# Patient Record
Sex: FEMALE | URBAN - METROPOLITAN AREA
[De-identification: names, ages, dates, MRNs, and addresses within clinical notes are randomized per-mention and may not be internally consistent; named-entity substitution may affect disease eponyms.]

---

## 2022-12-06 ENCOUNTER — OFFICE VISIT (OUTPATIENT)
Dept: OBGYN CLINIC | Age: 42
End: 2022-12-06
Payer: MEDICAID

## 2022-12-06 VITALS
HEIGHT: 65 IN | SYSTOLIC BLOOD PRESSURE: 140 MMHG | BODY MASS INDEX: 35.65 KG/M2 | DIASTOLIC BLOOD PRESSURE: 82 MMHG | WEIGHT: 214 LBS

## 2022-12-06 DIAGNOSIS — N92.0 MENORRHAGIA WITH REGULAR CYCLE: Primary | ICD-10-CM

## 2022-12-06 DIAGNOSIS — D25.9 UTERINE LEIOMYOMA, UNSPECIFIED LOCATION: ICD-10-CM

## 2022-12-06 PROCEDURE — G8427 DOCREV CUR MEDS BY ELIG CLIN: HCPCS | Performed by: OBSTETRICS & GYNECOLOGY

## 2022-12-06 PROCEDURE — 99213 OFFICE O/P EST LOW 20 MIN: CPT | Performed by: OBSTETRICS & GYNECOLOGY

## 2022-12-06 PROCEDURE — G8484 FLU IMMUNIZE NO ADMIN: HCPCS | Performed by: OBSTETRICS & GYNECOLOGY

## 2022-12-06 PROCEDURE — G8417 CALC BMI ABV UP PARAM F/U: HCPCS | Performed by: OBSTETRICS & GYNECOLOGY

## 2022-12-06 PROCEDURE — 4004F PT TOBACCO SCREEN RCVD TLK: CPT | Performed by: OBSTETRICS & GYNECOLOGY

## 2022-12-06 NOTE — PATIENT INSTRUCTIONS
We will schedule a pelvic ultrasound pelvic ultrasound to be done in the office at your convenience. We will contact you with that result and recommendation for follow-up. Happy holidays!

## 2022-12-06 NOTE — PROGRESS NOTES
[address] [facility] Enmanuel Talbot  [address] [place] [address] [address] [place] [address]       DATE OF VISIT:  22        History and Physical    Thai Duffy    :  1980  CHIEF COMPLAINT:    Chief Complaint   Patient presents with    Established New Doctor     New patient here for pelvic pain  has fibroids lmp 22 heavy with clots                     HPI :   Thai Duffy is a 43 y.o. femaleGRAVIDA 2 PARA    PCP: Amanda Cardoza MD    Thai Duffy presents to the office today with her  as a self-referral.  They are transplants from [place] and she has not seen a GYN for at least several years. She is an extremely poor historian but states that she has a chronic history of hydrocephalus and they are unsure of the etiology. She does have a PCP and a neurologist at the [facility] clinic. She has an appointment with the neurologist just after the new year. She is on her cycle today and does not want to be examined but states that she was told and 2016 that she had uterine fibroids. She has monthly cycles about every 26-28 days. Flow was only for 3 days but heavy. She also states that she suffers from chronic constipation. She denies any involuntary loss of urine.   She is otherwise without any significant complaints today.  _____________________________________________________________________  Past Medical History:   Diagnosis Date    High blood pressure                                                                    Past Surgical History:   Procedure Laterality Date    GALLBLADDER SURGERY  2018     Family History   Problem Relation Age of Onset    Hypertension Father     Diabetes Father     Hypertension Mother     Diabetes Mother      Social History     Tobacco Use   Smoking Status Every Day    Types: Cigarettes   Smokeless Tobacco Never     Social History     Substance and Sexual Activity   Alcohol Use None     Current Outpatient Medications   Medication Sig Dispense Refill    NONFORMULARY Blood pressure medication and medication for fluid on brain unknown of names       No current facility-administered medications for this visit. Allergies:  No Known Allergies    Gynecologic History:  Patient's last menstrual period was 2022. Sexually Active: Yes  STD History: No  Abnormal Pap History no  Birth Control: No    OB History    Para Term  AB Living   2 0 0 0 0 2   SAB IAB Ectopic Molar Multiple Live Births   0 0 0 0 0 0     ______________________________________________________________________  REVIEW OF SYSTEMS:        Constitutional:  Unexpected weight change no  Neurological:  Frequent headaches  yes  Ophthalmic:  Recent visual changes no  ENT:   Difficulty swallowing  no  Breast:              Masses   no     Respiratory:  Shortness of breath  no    Cardiovascular: Chest pain   no     Gastrointestinal: Chronic diarrhea/constipation yes   Urogenital:  Urinary incontinence  no                                         Heavy/irregular periods           yes                                      Vaginal discharge                   no  Hematological: Bruises easy   no     Endocrine:  Nipple Discharge  no     Hot/Cold Intolerance  no   Psychological:  Mood and affect were wnl yes                                                                                                                                           Physical Exam:    Vitals:    22 1335   BP: (!) 140/82   Site: Right Upper Arm   Position: Sitting   Cuff Size: Large Adult   Weight: 214 lb (97.1 kg)   Height: 5' 5\" (1.651 m)       General Appearance:  She does not appear to be in any distress. This  is a well developed, well nourished, well groomed female. Neurological:  The patient is alert and oriented to time, place, person, and situation without any noted sensory motor deficits. ASSESSMENT:         ICD-10-CM    1.  Menorrhagia with regular cycle  N92.0 US PELVIS COMPLETE      2. Uterine leiomyoma, unspecified location  D25.9 US PELVIS COMPLETE                      PLAN:  If Negative Cytology, Follow-up screening per current guidelines. Mammograms every 1year. If 35 yo and last mammogram was negative. Mixed incontinence - discussed bladder training and kegel exercises. Info given. Calcium and Vitamin D dosing reviewed. Colonoscopy screening reviewed as well as onset for bone density testing. Birth control and barrier recommendations discussed. STD counseling and prevention reviewed. Routine health maintenance per patients PCP. Pelvic ultrasound was ordered, she will be contacted with those results and will follow-up in the office afterwards. Return to the office in 1 year or when necessary  Patient was seen with total face to face time of 20 minutes. Aurelio Elliott M.D., [address] ,[place]. [address] [facility]  [place]

## 2023-02-08 NOTE — PROGRESS NOTES
DATE OF VISIT:  22           History and Physical     Dora Oh    :  1980  CHIEF COMPLAINT:           Chief Complaint   Patient presents with    Established New Doctor       New patient here for pelvic pain  has fibroids lmp 22 heavy with clots                         HPI :   Kayode Eli is a 43 y.o. femaleGRAVIDA 2 PARA 2002   PCP: Elin Bah MD     Kayode Eli presents to the office today with her  as a self-referral.  They are transplants from [place] and she has not seen a GYN for at least several years. She is an extremely poor historian but states that she has a chronic history of hydrocephalus and they are unsure of the etiology. She does have a PCP and a neurologist at the [facility] clinic. She has an appointment with the neurologist just after the new year. She is on her cycle today and does not want to be examined but states that she was told and 2016 that she had uterine fibroids. She has monthly cycles about every 26-28 days. Flow was only for 3 days but heavy. She also states that she suffers from chronic constipation. She denies any involuntary loss of urine. She is otherwise without any significant complaints today. Narrative   Table formatting from the original result was not included. Laney Juaquin   2022 11:40 AM EST   Progress Notes   PELVIC AND TRANSVAGINAL ULTRASOUND (NON-OB)       UTERUS: 10.3 x 6.9 x 5.6 cm   Subserosal fibroid seen = 3.5 x 4.2 x 5.2 cm   Multiple intramural fibroids seen, largest w/in the rt fundal ut = 2.7 x    2.5 x 2.4 cm       ENDO: 2.4 cm   Submucosal fibroid seen = 2.7 x 2.1 x 3.1 cm       RT. OVARY: 3.4 x 3.3 x 1.2 cm   unremarkable       LT. OVARY: 1.5 x 1.9 x 1.4 cm   unremarkable       no pelvic free fluid seen            Date: 12/15/2022   Called and spoke to the patient on the phone regarding ultrasound results.      Seen somewhat confused but she does understand that she has multiple fibroids. Also discussed her cycles are regular and heavy for just 3 days. Recommend that she contact her PCP and follow-up with her neurologist.   Also discussed if she desires intervention for her cycles or they become    heavier and/or more painful to schedule a follow-up appointment. Clair Coyne MD, mph, FACOG

## 2023-02-09 ENCOUNTER — OFFICE VISIT (OUTPATIENT)
Dept: OBGYN CLINIC | Age: 43
End: 2023-02-09
Payer: MEDICAID

## 2023-02-09 VITALS
BODY MASS INDEX: 34.99 KG/M2 | SYSTOLIC BLOOD PRESSURE: 140 MMHG | WEIGHT: 210 LBS | HEIGHT: 65 IN | DIASTOLIC BLOOD PRESSURE: 84 MMHG

## 2023-02-09 DIAGNOSIS — D25.9 UTERINE LEIOMYOMA, UNSPECIFIED LOCATION: ICD-10-CM

## 2023-02-09 DIAGNOSIS — N92.0 MENORRHAGIA WITH REGULAR CYCLE: Primary | ICD-10-CM

## 2023-02-09 DIAGNOSIS — R41.3 MEMORY LOSS OR IMPAIRMENT: ICD-10-CM

## 2023-02-09 PROCEDURE — 4004F PT TOBACCO SCREEN RCVD TLK: CPT | Performed by: OBSTETRICS & GYNECOLOGY

## 2023-02-09 PROCEDURE — G8417 CALC BMI ABV UP PARAM F/U: HCPCS | Performed by: OBSTETRICS & GYNECOLOGY

## 2023-02-09 PROCEDURE — 99212 OFFICE O/P EST SF 10 MIN: CPT | Performed by: OBSTETRICS & GYNECOLOGY

## 2023-02-09 PROCEDURE — G8484 FLU IMMUNIZE NO ADMIN: HCPCS | Performed by: OBSTETRICS & GYNECOLOGY

## 2023-02-09 PROCEDURE — G8428 CUR MEDS NOT DOCUMENT: HCPCS | Performed by: OBSTETRICS & GYNECOLOGY

## 2023-02-09 RX ORDER — OMEPRAZOLE 40 MG/1
CAPSULE, DELAYED RELEASE ORAL
COMMUNITY
Start: 2023-01-30

## 2023-02-09 RX ORDER — AMLODIPINE AND OLMESARTAN MEDOXOMIL 5; 20 MG/1; MG/1
TABLET ORAL
COMMUNITY
Start: 2023-01-30

## 2023-02-09 NOTE — PATIENT INSTRUCTIONS
Please read the information given to you on [address] hysterectomy. Remember to discuss surgical clearance for your hysterectomy at your next appointment with your neurologist.  Once he has done the medical clearance, please schedule an appointment in the office with your  present to be consented for your surgery.

## 2023-02-19 NOTE — PROGRESS NOTES
[facility] [address] [facility] [address]      DATE OF VISIT:  23        History and Physical    Shana Engel    :  1980  CHIEF COMPLAINT:    Chief Complaint   Patient presents with    Follow-up     Hasd U/S 22                    HPI :   Shana Engel is a 43 y.o. femaleGRAVIDA 2 PARA    PCP: Zaki Campuzano MD     Shana Engel was seen in the office as a new visit on 2022 with her  as a self-referral.  They are transplants from [place] and she has not seen a GYN for at least several years. She is an extremely poor historian but states that she has a chronic history of hydrocephalus and they are unsure of the etiology. She does have a PCP and a neurologist at the [facility] clinic. She had an appointment with the neurologist just after the new year. She was on her cycle and not want to be examined but stated that she was told and 2016 that she had uterine fibroids. She has monthly cycles about every 26-28 days. Flow was only for 3 days but heavy. She also stated that she suffers from chronic constipation. She denied any involuntary loss of urine. She was otherwise without any significant complaints. Ultrasound was ordered and she returned today to discuss results and definitive surgery secondary to:     Diagnosis Orders   1. Menorrhagia with regular cycle        2. Uterine leiomyoma, unspecified location        3. Memory loss or impairment            Narrative   Table formatting from the original result was not included. Brett Meadows   2022 11:40 AM EST   Progress Notes   PELVIC AND TRANSVAGINAL ULTRASOUND (NON-OB)       UTERUS: 10.3 x 6.9 x 5.6 cm   Subserosal fibroid seen = 3.5 x 4.2 x 5.2 cm   Multiple intramural fibroids seen, largest w/in the rt fundal ut = 2.7 x    2.5 x 2.4 cm       ENDO: 2.4 cm   Submucosal fibroid seen = 2.7 x 2.1 x 3.1 cm       RT. OVARY: 3.4 x 3.3 x 1.2 cm   unremarkable       LT. OVARY: 1.5 x 1.9 x 1.4 cm   unremarkable       no pelvic free fluid seen            Date: 12/15/2022   Called and spoke to the patient on the phone regarding ultrasound results. Seen somewhat confused but she does understand that she has multiple    fibroids. Also discussed her cycles are regular and heavy for just 3 days. Recommend that she contact her PCP and follow-up with her neurologist.   Also discussed if she desires intervention for her cycles or they become    heavier and/or more painful to schedule a follow-up appointment. Wellington Rae MD, Mancil Able       _____________________________________________________________________  Past Medical History:   Diagnosis Date    High blood pressure                                                                    Past Surgical History:   Procedure Laterality Date    GALLBLADDER SURGERY  2018     Family History   Problem Relation Age of Onset    Hypertension Father     Diabetes Father     Hypertension Mother     Diabetes Mother      Social History     Tobacco Use   Smoking Status Every Day    Types: Cigarettes   Smokeless Tobacco Never     Social History     Substance and Sexual Activity   Alcohol Use None     Current Outpatient Medications   Medication Sig Dispense Refill    amLODIPine-olmesartan (NEWTON) 5-20 MG per tablet       omeprazole (PRILOSEC) 40 MG delayed release capsule       NONFORMULARY Blood pressure medication and medication for fluid on brain unknown of names       No current facility-administered medications for this visit. Allergies:  No Known Allergies    Gynecologic History:  Patient's last menstrual period was 2023.   Sexually Active: Yes  STD History: No  Abnormal Pap History no  Birth Control: No    OB History    Para Term  AB Living   2 0 0 0 0 2   SAB IAB Ectopic Molar Multiple Live Births   0 0 0 0 0 0 ______________________________________________________________________  REVIEW OF SYSTEMS:        Constitutional:  Unexpected weight change no  Neurological:  Frequent headaches  no  Ophthalmic:  Recent visual changes no  ENT:   Difficulty swallowing  no  Breast:              Masses   no     Respiratory:  Shortness of breath  no    Cardiovascular: Chest pain   no     Gastrointestinal: Chronic diarrhea/constipation yes   Urogenital:  Urinary incontinence  no                                         Heavy/irregular periods           yes                                      Vaginal discharge                   no  Hematological: Bruises easy   no     Endocrine:  Nipple Discharge  no     Hot/Cold Intolerance  no   Psychological:  Mood and affect were wnl yes                                                                                                                                           Physical Exam:    Vitals:    02/09/23 1041 02/09/23 1109   BP: (!) 140/82 (!) 140/84   Site: Right Upper Arm    Position: Sitting    Cuff Size: Large Adult    Weight: 210 lb (95.3 kg)    Height: 5' 5\" (1.651 m)        General Appearance:  She does not appear to be in any distress. This  is a well developed, well nourished, well groomed female. Neurological:  The patient is alert and oriented to time, place, person, and situation without any noted sensory motor deficits. Skin:  A brief inspection of the skin revealed no rashes or lesions. Neck:  The neck was supple. There is no tracheal deviation, thyromegaly or supraclavicular adenopathy appreciated. Breast:   The patients breasts were symmetrical.  Breasts are nontender and there  were no masses, discharge or pathologic skin changes. There is no supraclavicular or axillary adenopathy bilaterally. Respiratory: There was unlabored respiratory effort.  Lungs clear to ascultation without wheezes, rales or rhonchi in all fields bilaterally. Cardiovascular:  Normal sinus rhythm with a regular rate and without murmur, rubs or gallops. Abdomen: The abdomen was soft and non-tender with no guarding, rebound, CVAT or rigidity. No hernias were appreciated. Bowel sounds were normally active. Pelvic exam not done:    Extremities:  FROM and nontender without clubbing cyanosis or edema. ASSESSMENT:    Diagnosis Orders   1. Menorrhagia with regular cycle        2. Uterine leiomyoma, unspecified location        3. Memory loss or impairment                       PLAN:  I began the proper informed consent process and initially asked if she would understand and remember what we discussed today. She replied \"I I honestly do not think I would will remember anything. \"  At that point I did not continue the consenting process as I did not feel she may be competent to consent? I then asked her if she really understood why she was here today? She seemed quite confused and replied \"she really did not? \"  I explained to her that her cycles are regular and only last 3 days without any significant cramping and she may want to discuss with her  or mother whether or not she does really desire definitive hysterectomy. I informed her that I would contact her legal department regarding her specific situation regarding ability to consent for surgery. She was given information on [address] hysterectomy and instructed to inform her neurologist and her upcoming visit about possibly having surgery and need for surgical clearance. Also informed her that once that is done to return to the office preferably with her  sounds like that is her POA to be consented. Ramin Smith MD, MPH, [facility]. [facility] [place]    Patient was seen with total face to face time of 20 minutes.

## 2023-03-23 ENCOUNTER — PATIENT MESSAGE (OUTPATIENT)
Dept: OBGYN CLINIC | Age: 43
End: 2023-03-23

## 2023-03-23 ENCOUNTER — TELEPHONE (OUTPATIENT)
Dept: OBGYN CLINIC | Age: 43
End: 2023-03-23

## 2023-03-23 NOTE — TELEPHONE ENCOUNTER
Pt called wanting to know when Dr. Anay Weiner wanted to schedule her surgery. She states she was supposed to hear about getting it done and she has not heard anything yet. I reviewed physicians note and looks like she was advised to schedule a follow up appointment if she wanted to discuss getting help with her periods. Please advise/confirm patient needs an appt to consult with doc about options.

## 2023-03-27 ENCOUNTER — TELEPHONE (OUTPATIENT)
Dept: OBGYN CLINIC | Age: 43
End: 2023-03-27

## 2023-03-27 NOTE — TELEPHONE ENCOUNTER
From: Dr. Casi Castle  To: Merari Salguero  Sent: 3/23/2023 4:24 PM EDT  Subject: ,Surgery scheduling    Pineda Paganten, at your last visit we were unable to be able to consent you for surgery because of your memory issues. I advised you to see your neurologist which you told me was at [facility] clinic for any special recommendations we may have to do during your surgery. I believe your PCP did give us clearance however. Because of your memory impairment to be consented you will either have to have your  present or your mother to be legal witnesses.

## 2023-03-27 NOTE — TELEPHONE ENCOUNTER
Pt states that she was calling to get scheduled for surgery. I stated that she did have a [employer] message to read still. We went over Dr. Bj Velasco most recent message to her on the 23rd regarding her surgery. She states that she does not and has not ever had a Neurologist.     Please advise if I can assist in any way.  TY

## 2023-04-10 ENCOUNTER — OFFICE VISIT (OUTPATIENT)
Dept: OBGYN CLINIC | Age: 43
End: 2023-04-10
Payer: MEDICAID

## 2023-04-10 VITALS — BODY MASS INDEX: 34.95 KG/M2 | WEIGHT: 210 LBS | DIASTOLIC BLOOD PRESSURE: 82 MMHG | SYSTOLIC BLOOD PRESSURE: 132 MMHG

## 2023-04-10 DIAGNOSIS — R41.3 MEMORY LOSS OR IMPAIRMENT: ICD-10-CM

## 2023-04-10 DIAGNOSIS — N92.0 MENORRHAGIA WITH REGULAR CYCLE: Primary | ICD-10-CM

## 2023-04-10 DIAGNOSIS — D25.9 UTERINE LEIOMYOMA, UNSPECIFIED LOCATION: ICD-10-CM

## 2023-04-10 PROCEDURE — 99214 OFFICE O/P EST MOD 30 MIN: CPT | Performed by: OBSTETRICS & GYNECOLOGY

## 2023-04-10 PROCEDURE — 4004F PT TOBACCO SCREEN RCVD TLK: CPT | Performed by: OBSTETRICS & GYNECOLOGY

## 2023-04-10 PROCEDURE — G8417 CALC BMI ABV UP PARAM F/U: HCPCS | Performed by: OBSTETRICS & GYNECOLOGY

## 2023-04-10 PROCEDURE — G8427 DOCREV CUR MEDS BY ELIG CLIN: HCPCS | Performed by: OBSTETRICS & GYNECOLOGY

## 2023-04-10 RX ORDER — VENLAFAXINE HYDROCHLORIDE 37.5 MG/1
CAPSULE, EXTENDED RELEASE ORAL
COMMUNITY
Start: 2023-04-06

## 2023-04-10 RX ORDER — ACETAZOLAMIDE 500 MG/1
CAPSULE, EXTENDED RELEASE ORAL
COMMUNITY

## 2023-04-10 RX ORDER — RIZATRIPTAN BENZOATE 10 MG/1
10 TABLET, ORALLY DISINTEGRATING ORAL
COMMUNITY

## 2023-04-10 RX ORDER — DOXYCYCLINE HYCLATE 50 MG/1
CAPSULE, GELATIN COATED ORAL
COMMUNITY
Start: 2022-10-26

## 2023-04-26 RX ORDER — SODIUM CHLORIDE, SODIUM LACTATE, POTASSIUM CHLORIDE, CALCIUM CHLORIDE 600; 310; 30; 20 MG/100ML; MG/100ML; MG/100ML; MG/100ML
1000 INJECTION, SOLUTION INTRAVENOUS CONTINUOUS
OUTPATIENT
Start: 2023-04-26

## 2023-04-27 ENCOUNTER — TELEPHONE (OUTPATIENT)
Dept: OBGYN CLINIC | Age: 43
End: 2023-04-27

## 2023-04-27 NOTE — TELEPHONE ENCOUNTER
Called [facility] at [phone] asked for Reji Rodríguez but Rani Dee helped me needed to know about pap patient refused to be examined and patient is extremely poor historian  she has a chronic history of hydrocephalus  with memory issues

## 2023-04-27 NOTE — TELEPHONE ENCOUNTER
Insurance prior Germaine Bailey called stating to cover surgery they need a records of most resent pap or she will need to have one done.

## 2023-05-01 ENCOUNTER — HOSPITAL ENCOUNTER (OUTPATIENT)
Dept: PREADMISSION TESTING | Age: 43
Discharge: HOME | End: 2023-05-05
Payer: MEDICAID

## 2023-05-01 VITALS
DIASTOLIC BLOOD PRESSURE: 96 MMHG | TEMPERATURE: 98.2 F | OXYGEN SATURATION: 100 % | HEIGHT: 65 IN | BODY MASS INDEX: 34.32 KG/M2 | SYSTOLIC BLOOD PRESSURE: 150 MMHG | WEIGHT: 206 LBS | RESPIRATION RATE: 18 BRPM | HEART RATE: 101 BPM

## 2023-05-01 LAB
ABO/RH: NORMAL
ANION GAP SERPL CALCULATED.3IONS-SCNC: 9 MMOL/L (ref 9–17)
ANTIBODY SCREEN: NEGATIVE
ARM BAND NUMBER: NORMAL
BUN SERPL-MCNC: 8 MG/DL (ref 6–20)
CALCIUM SERPL-MCNC: 8.7 MG/DL (ref 8.6–10.4)
CHLORIDE SERPL-SCNC: 108 MMOL/L (ref 98–107)
CO2 SERPL-SCNC: 25 MMOL/L (ref 20–31)
CREAT SERPL-MCNC: 0.88 MG/DL (ref 0.5–0.9)
EXPIRATION DATE: NORMAL
GFR SERPL CREATININE-BSD FRML MDRD: >60 ML/MIN/1.73M2
GLUCOSE SERPL-MCNC: 93 MG/DL (ref 70–99)
HCG QUALITATIVE: NEGATIVE
HCT VFR BLD AUTO: 28.3 % (ref 36.3–47.1)
HGB BLD-MCNC: 8.6 G/DL (ref 11.9–15.1)
MCH RBC QN AUTO: 20.2 PG (ref 25.2–33.5)
MCHC RBC AUTO-ENTMCNC: 30.4 G/DL (ref 28.4–34.8)
MCV RBC AUTO: 66.6 FL (ref 82.6–102.9)
NRBC AUTOMATED: 0 PER 100 WBC
PDW BLD-RTO: 20.3 % (ref 11.8–14.4)
PLATELET # BLD AUTO: 293 K/UL (ref 138–453)
PMV BLD AUTO: 9.8 FL (ref 8.1–13.5)
POTASSIUM SERPL-SCNC: 3.8 MMOL/L (ref 3.7–5.3)
RBC # BLD: 4.25 M/UL (ref 3.95–5.11)
SODIUM SERPL-SCNC: 142 MMOL/L (ref 135–144)
WBC # BLD AUTO: 9.3 K/UL (ref 3.5–11.3)

## 2023-05-01 PROCEDURE — 80048 BASIC METABOLIC PNL TOTAL CA: CPT

## 2023-05-01 PROCEDURE — 36415 COLL VENOUS BLD VENIPUNCTURE: CPT

## 2023-05-01 PROCEDURE — 86900 BLOOD TYPING SEROLOGIC ABO: CPT

## 2023-05-01 PROCEDURE — 93005 ELECTROCARDIOGRAM TRACING: CPT | Performed by: ANESTHESIOLOGY

## 2023-05-01 PROCEDURE — 86850 RBC ANTIBODY SCREEN: CPT

## 2023-05-01 PROCEDURE — 84703 CHORIONIC GONADOTROPIN ASSAY: CPT

## 2023-05-01 PROCEDURE — 85027 COMPLETE CBC AUTOMATED: CPT

## 2023-05-01 PROCEDURE — 86901 BLOOD TYPING SEROLOGIC RH(D): CPT

## 2023-05-01 RX ORDER — ENOXAPARIN SODIUM 100 MG/ML
40 INJECTION SUBCUTANEOUS ONCE
OUTPATIENT
Start: 2023-05-01 | End: 2023-05-01

## 2023-05-01 ASSESSMENT — PAIN DESCRIPTION - FREQUENCY: FREQUENCY: INTERMITTENT

## 2023-05-01 ASSESSMENT — PAIN DESCRIPTION - PAIN TYPE: TYPE: CHRONIC PAIN

## 2023-05-01 ASSESSMENT — PAIN SCALES - GENERAL: PAINLEVEL_OUTOF10: 9

## 2023-05-01 ASSESSMENT — PAIN DESCRIPTION - ORIENTATION: ORIENTATION: RIGHT

## 2023-05-01 ASSESSMENT — PAIN DESCRIPTION - LOCATION: LOCATION: ABDOMEN

## 2023-05-01 NOTE — PROGRESS NOTES
Anesthesia Focused Assessment    Recently tested positive for COVID? No    STOP-BANG Sleep Apnea Questionnaire    SNORE loudly (heard through closed doors)? No  TIRED, fatigued, sleepy during daytime? No  OBSERVED stopping breathing during sleep? No  High blood PRESSURE being treated? Yes    BMI over 35? No  AGE over 48? No  NECK circumference over 16\"? No  GENDER (male)? No             Total 1  High risk 5-8  Intermediate risk 3-4  Low risk 0-2    Obstructive Sleep Apnea: denies  If YES, machine used: no     Type 1 DM:   no  T2DM:  no    Coronary Artery Disease:  no  Hypertension:  yes    Active smoker:  1/2 PPD for years  Drinks Alcohol:  monthly    Dentition: benign    Defib / AICD / Pacemaker: no      Renal Failure/dialysis:  no    Patient was evaluated in PAT & anesthesia guidelines were applied. NPO guidelines, medication instructions and scheduled arrival time were reviewed with patient. I advised patient to please contact the surgeon's office, ahead of time if possible, if any new signs or symptoms of illness, infection, rash, etc    Hx of anesthesia complications:  no  Family hx of anesthesia complications:  no                                                                                                                     Anesthesia contacted:     Medical or cardiac clearance ordered: neurology clearance as well as office notes in chart from Dr. Andrey Bynum. PCP clearance and office note in chart as well. Will fax EKG to PCP for updated medical clearance.     Bart Lopez PA-C  5/1/23  4:13 PM

## 2023-05-02 LAB
EKG ATRIAL RATE: 93 BPM
EKG P AXIS: 56 DEGREES
EKG P-R INTERVAL: 152 MS
EKG Q-T INTERVAL: 412 MS
EKG QRS DURATION: 76 MS
EKG QTC CALCULATION (BAZETT): 512 MS
EKG R AXIS: -21 DEGREES
EKG T AXIS: 33 DEGREES
EKG VENTRICULAR RATE: 93 BPM

## 2023-05-03 ENCOUNTER — TELEPHONE (OUTPATIENT)
Dept: OBGYN CLINIC | Age: 43
End: 2023-05-03

## 2023-05-08 ENCOUNTER — TELEPHONE (OUTPATIENT)
Dept: OBGYN CLINIC | Age: 43
End: 2023-05-08

## 2023-05-08 NOTE — TELEPHONE ENCOUNTER
Sanjana calling from [employer], pt surgery had been denied, Dr. Lashawn Stephen did a peer to peer and that was also denied. If Wilma Dennis wants to pursue applying for an appeal. That will need to be done per office. Number to call for appeal, [phone]    The office will need to send them pt power of  and signed consent for surgery.

## 2023-05-09 ENCOUNTER — TELEPHONE (OUTPATIENT)
Dept: OBGYN CLINIC | Age: 43
End: 2023-05-09

## 2023-05-09 PROBLEM — Z98.890 POSTOPERATIVE STATE: Status: ACTIVE | Noted: 2023-05-09

## 2023-05-09 LAB
ABO/RH: NORMAL
ANTIBODY SCREEN: NEGATIVE
ARM BAND NUMBER: NORMAL
EXPIRATION DATE: NORMAL

## 2023-05-09 NOTE — TELEPHONE ENCOUNTER
Called and left message on Henrry cell number and left message that her surgery was cancelled  tried [address] phone several times just rang and then stopped unable to leave message that her surgery is cancelled

## 2023-05-09 NOTE — TELEPHONE ENCOUNTER
Called [facility] to get appeals fax number it is [phone] send all papers consents for appeal on appeal

## 2023-05-10 ENCOUNTER — TELEPHONE (OUTPATIENT)
Dept: OBGYN CLINIC | Age: 43
End: 2023-05-10

## 2023-05-10 PROBLEM — Z98.890 POSTOPERATIVE STATE: Status: RESOLVED | Noted: 2023-05-09 | Resolved: 2023-05-10

## 2023-05-10 NOTE — TELEPHONE ENCOUNTER
Just calling to inform that the appeal was received. It is currently being reviewed and Mra will call with the determination in the next 48 hours or so to advise. TY!

## 2023-05-18 ENCOUNTER — TELEPHONE (OUTPATIENT)
Dept: OBGYN CLINIC | Age: 43
End: 2023-05-18

## 2023-05-18 NOTE — TELEPHONE ENCOUNTER
Called Dora because she called and talked with Whitney Chacon and wanted us to call her back  called patient twice it rang and then just hung up

## 2023-05-18 NOTE — TELEPHONE ENCOUNTER
[facility] talked with calista [phone] about the appeal from peer to peer she states it was also denied and will be faxing us the letter and what the next step can be

## 2023-05-18 NOTE — TELEPHONE ENCOUNTER
Pt called in very upset stating she wanted to speak to Dr. Etelvina Osei or his nurse. She stated she didn't understand why he would put in for a total hysterectomy and not a partial like they discussed in the office. Pt stated that's why she thinks her insurance is denying her surgery. Pt requested a call back from either the doctor or his nurse.

## 2023-05-19 NOTE — TELEPHONE ENCOUNTER
Pt returned calls today and she is wondering if it is possible to remove her fibroids w/o doing a full hyst?     After consulting with 2021 Latasha Lee pt that it would depend on the severity of the fibroid and the size. I would send a message over to Dr. Laura Mccoy and Luís Beck to let them know she was inquiring about the possibility of this. Please call patient back as soon as possible next week to discuss this further with her. I did also advise her on the latest with her surgical appeal and that hearing back from them at this point can take up to 30 days per [facility] RN. I do believe this is what prompted the interest in having just the fibroid removed without doing the full hysterectomy.

## 2023-06-04 ENCOUNTER — HOSPITAL ENCOUNTER (EMERGENCY)
Facility: CLINIC | Age: 43
Discharge: HOME | End: 2023-06-04
Attending: EMERGENCY MEDICINE
Payer: MEDICAID

## 2023-06-04 VITALS
WEIGHT: 206 LBS | SYSTOLIC BLOOD PRESSURE: 155 MMHG | BODY MASS INDEX: 34.32 KG/M2 | HEIGHT: 65 IN | RESPIRATION RATE: 16 BRPM | OXYGEN SATURATION: 99 % | HEART RATE: 105 BPM | DIASTOLIC BLOOD PRESSURE: 109 MMHG

## 2023-06-04 DIAGNOSIS — G43.809 OTHER MIGRAINE WITHOUT STATUS MIGRAINOSUS, NOT INTRACTABLE: Primary | ICD-10-CM

## 2023-06-04 DIAGNOSIS — I10 HYPERTENSION, UNSPECIFIED TYPE: ICD-10-CM

## 2023-06-04 PROCEDURE — 99284 EMERGENCY DEPT VISIT MOD MDM: CPT

## 2023-06-04 PROCEDURE — 2580000003 HC RX 258: Performed by: EMERGENCY MEDICINE

## 2023-06-04 PROCEDURE — 6360000002 HC RX W HCPCS: Performed by: EMERGENCY MEDICINE

## 2023-06-04 PROCEDURE — 2500000003 HC RX 250 WO HCPCS: Performed by: EMERGENCY MEDICINE

## 2023-06-04 PROCEDURE — 96375 TX/PRO/DX INJ NEW DRUG ADDON: CPT

## 2023-06-04 PROCEDURE — 96374 THER/PROPH/DIAG INJ IV PUSH: CPT

## 2023-06-04 RX ORDER — PROCHLORPERAZINE EDISYLATE 5 MG/ML
10 INJECTION INTRAMUSCULAR; INTRAVENOUS ONCE
Status: COMPLETED | OUTPATIENT
Start: 2023-06-04 | End: 2023-06-04

## 2023-06-04 RX ORDER — DIPHENHYDRAMINE HYDROCHLORIDE 50 MG/ML
25 INJECTION INTRAMUSCULAR; INTRAVENOUS ONCE
Status: COMPLETED | OUTPATIENT
Start: 2023-06-04 | End: 2023-06-04

## 2023-06-04 RX ORDER — LABETALOL HYDROCHLORIDE 5 MG/ML
10 INJECTION, SOLUTION INTRAVENOUS ONCE
Status: COMPLETED | OUTPATIENT
Start: 2023-06-04 | End: 2023-06-04

## 2023-06-04 RX ORDER — SODIUM CHLORIDE 9 MG/ML
INJECTION, SOLUTION INTRAVENOUS CONTINUOUS
Status: DISCONTINUED | OUTPATIENT
Start: 2023-06-04 | End: 2023-06-04 | Stop reason: HOSPADM

## 2023-06-04 RX ORDER — FUROSEMIDE 10 MG/ML
20 INJECTION INTRAMUSCULAR; INTRAVENOUS ONCE
Status: COMPLETED | OUTPATIENT
Start: 2023-06-04 | End: 2023-06-04

## 2023-06-04 RX ADMIN — DIPHENHYDRAMINE HYDROCHLORIDE 25 MG: 50 INJECTION, SOLUTION INTRAMUSCULAR; INTRAVENOUS at 14:56

## 2023-06-04 RX ADMIN — FUROSEMIDE 20 MG: 10 INJECTION, SOLUTION INTRAMUSCULAR; INTRAVENOUS at 14:56

## 2023-06-04 RX ADMIN — LABETALOL HYDROCHLORIDE 10 MG: 5 INJECTION INTRAVENOUS at 15:36

## 2023-06-04 RX ADMIN — PROCHLORPERAZINE EDISYLATE 10 MG: 5 INJECTION, SOLUTION INTRAMUSCULAR; INTRAVENOUS at 14:55

## 2023-06-04 RX ADMIN — SODIUM CHLORIDE: 9 INJECTION, SOLUTION INTRAVENOUS at 14:57

## 2023-06-04 ASSESSMENT — PAIN SCALES - GENERAL: PAINLEVEL_OUTOF10: 10

## 2023-06-04 ASSESSMENT — PAIN DESCRIPTION - LOCATION: LOCATION: HEAD;NECK

## 2023-06-04 ASSESSMENT — PAIN - FUNCTIONAL ASSESSMENT: PAIN_FUNCTIONAL_ASSESSMENT: 0-10

## 2023-06-07 ENCOUNTER — HOSPITAL ENCOUNTER (EMERGENCY)
Facility: CLINIC | Age: 43
Discharge: LEFT BEFORE DISCHARGE | End: 2023-06-07
Attending: EMERGENCY MEDICINE
Payer: MEDICAID

## 2023-06-07 ENCOUNTER — APPOINTMENT (OUTPATIENT)
Dept: CT IMAGING | Facility: CLINIC | Age: 43
End: 2023-06-07
Payer: MEDICAID

## 2023-06-07 VITALS
RESPIRATION RATE: 18 BRPM | TEMPERATURE: 98.5 F | SYSTOLIC BLOOD PRESSURE: 172 MMHG | BODY MASS INDEX: 34.32 KG/M2 | HEART RATE: 90 BPM | DIASTOLIC BLOOD PRESSURE: 111 MMHG | OXYGEN SATURATION: 98 % | HEIGHT: 65 IN | WEIGHT: 206 LBS

## 2023-06-07 DIAGNOSIS — N39.0 ACUTE UTI: ICD-10-CM

## 2023-06-07 DIAGNOSIS — R10.9 ABDOMINAL PAIN, UNSPECIFIED ABDOMINAL LOCATION: Primary | ICD-10-CM

## 2023-06-07 LAB
ALBUMIN SERPL-MCNC: 4.1 G/DL (ref 3.5–5.2)
ALBUMIN/GLOB SERPL: 1.3 {RATIO} (ref 1–2.5)
ALP SERPL-CCNC: 96 U/L (ref 35–104)
ALT SERPL-CCNC: 12 U/L (ref 5–33)
ANION GAP SERPL CALCULATED.3IONS-SCNC: 9 MMOL/L (ref 9–17)
AST SERPL-CCNC: 13 U/L
BACTERIA URNS QL MICRO: ABNORMAL
BASOPHILS # BLD: 0 K/UL (ref 0–0.2)
BASOPHILS NFR BLD: 0 % (ref 0–2)
BILIRUB SERPL-MCNC: <0.1 MG/DL (ref 0.3–1.2)
BILIRUB UR QL STRIP: NEGATIVE
BUN SERPL-MCNC: 8 MG/DL (ref 6–20)
CALCIUM SERPL-MCNC: 9.2 MG/DL (ref 8.6–10.4)
CHARACTER UR: ABNORMAL
CHLORIDE SERPL-SCNC: 105 MMOL/L (ref 98–107)
CLARITY UR: CLEAR
CO2 SERPL-SCNC: 23 MMOL/L (ref 20–31)
COLOR UR: YELLOW
CREAT SERPL-MCNC: 0.7 MG/DL (ref 0.5–0.9)
EOSINOPHIL # BLD: 0.24 K/UL (ref 0–0.4)
EOSINOPHILS RELATIVE PERCENT: 3 % (ref 1–4)
EPI CELLS #/AREA URNS HPF: ABNORMAL /HPF (ref 0–5)
ERYTHROCYTE [DISTWIDTH] IN BLOOD BY AUTOMATED COUNT: 20.8 % (ref 12.5–15.4)
GFR SERPL CREATININE-BSD FRML MDRD: >60 ML/MIN/1.73M2
GLUCOSE SERPL-MCNC: 103 MG/DL (ref 70–99)
GLUCOSE UR STRIP-MCNC: NEGATIVE MG/DL
HCG(URINE) PREGNANCY TEST: NEGATIVE
HCT VFR BLD AUTO: 28.4 % (ref 36–46)
HGB BLD-MCNC: 8.9 G/DL (ref 12–16)
HGB UR QL STRIP.AUTO: NEGATIVE
KETONES UR STRIP-MCNC: NEGATIVE MG/DL
LEUKOCYTE ESTERASE UR QL STRIP: ABNORMAL
LIPASE SERPL-CCNC: 41 U/L (ref 13–60)
LYMPHOCYTES # BLD: 34 % (ref 24–44)
LYMPHOCYTES NFR BLD: 2.75 K/UL (ref 1–4.8)
MCH RBC QN AUTO: 19.8 PG (ref 26–34)
MCHC RBC AUTO-ENTMCNC: 31.3 G/DL (ref 31–37)
MCV RBC AUTO: 63.4 FL (ref 80–100)
MONOCYTES NFR BLD: 0.81 K/UL (ref 0.1–0.8)
MONOCYTES NFR BLD: 10 % (ref 1–7)
MORPHOLOGY: ABNORMAL
NEUTROPHILS NFR BLD: 53 % (ref 36–66)
NEUTS SEG NFR BLD: 4.3 K/UL (ref 1.8–7.7)
NITRITE UR QL STRIP: POSITIVE
PH UR STRIP: 6.5 [PH] (ref 5–8)
PLATELET # BLD AUTO: 269 K/UL (ref 140–450)
PMV BLD AUTO: 8.3 FL (ref 6–12)
POTASSIUM SERPL-SCNC: 4 MMOL/L (ref 3.7–5.3)
PROT SERPL-MCNC: 7.3 G/DL (ref 6.4–8.3)
PROT UR STRIP-MCNC: NEGATIVE MG/DL
RBC # BLD AUTO: 4.48 M/UL (ref 4–5.2)
RBC #/AREA URNS HPF: ABNORMAL /HPF (ref 0–2)
SODIUM SERPL-SCNC: 137 MMOL/L (ref 135–144)
SP GR UR STRIP: 1.01 (ref 1–1.03)
UROBILINOGEN UR STRIP-ACNC: NORMAL
WBC #/AREA URNS HPF: ABNORMAL /HPF (ref 0–5)
WBC OTHER # BLD: 8.1 K/UL (ref 3.5–11)

## 2023-06-07 PROCEDURE — 81025 URINE PREGNANCY TEST: CPT

## 2023-06-07 PROCEDURE — 85027 COMPLETE CBC AUTOMATED: CPT

## 2023-06-07 PROCEDURE — 81001 URINALYSIS AUTO W/SCOPE: CPT

## 2023-06-07 PROCEDURE — 36415 COLL VENOUS BLD VENIPUNCTURE: CPT

## 2023-06-07 PROCEDURE — 2580000003 HC RX 258: Performed by: PHYSICIAN ASSISTANT

## 2023-06-07 PROCEDURE — 6360000002 HC RX W HCPCS: Performed by: PHYSICIAN ASSISTANT

## 2023-06-07 PROCEDURE — 83690 ASSAY OF LIPASE: CPT

## 2023-06-07 PROCEDURE — 74177 CT ABD & PELVIS W/CONTRAST: CPT

## 2023-06-07 PROCEDURE — 80053 COMPREHEN METABOLIC PANEL: CPT

## 2023-06-07 PROCEDURE — 6360000004 HC RX CONTRAST MEDICATION: Performed by: PHYSICIAN ASSISTANT

## 2023-06-07 RX ORDER — SODIUM CHLORIDE 0.9 % (FLUSH) 0.9 %
10 SYRINGE (ML) INJECTION PRN
Status: DISCONTINUED | OUTPATIENT
Start: 2023-06-07 | End: 2023-06-07 | Stop reason: HOSPADM

## 2023-06-07 RX ORDER — KETOROLAC TROMETHAMINE 30 MG/ML
30 INJECTION, SOLUTION INTRAMUSCULAR; INTRAVENOUS ONCE
Status: COMPLETED | OUTPATIENT
Start: 2023-06-07 | End: 2023-06-07

## 2023-06-07 RX ORDER — CEPHALEXIN 500 MG/1
500 CAPSULE ORAL 3 TIMES DAILY
Qty: 15 CAPSULE | Refills: 0 | Status: SHIPPED | OUTPATIENT
Start: 2023-06-07 | End: 2023-06-12

## 2023-06-07 RX ORDER — 0.9 % SODIUM CHLORIDE 0.9 %
70 INTRAVENOUS SOLUTION INTRAVENOUS ONCE
Status: DISCONTINUED | OUTPATIENT
Start: 2023-06-07 | End: 2023-06-07 | Stop reason: HOSPADM

## 2023-06-07 RX ADMIN — IOPAMIDOL 75 ML: 755 INJECTION, SOLUTION INTRAVENOUS at 14:58

## 2023-06-07 RX ADMIN — SODIUM CHLORIDE, PRESERVATIVE FREE 10 ML: 5 INJECTION INTRAVENOUS at 15:05

## 2023-06-07 RX ADMIN — KETOROLAC TROMETHAMINE 30 MG: 30 INJECTION, SOLUTION INTRAMUSCULAR; INTRAVENOUS at 16:01

## 2023-06-07 ASSESSMENT — PAIN DESCRIPTION - FREQUENCY: FREQUENCY: CONTINUOUS

## 2023-06-07 ASSESSMENT — PAIN SCALES - GENERAL
PAINLEVEL_OUTOF10: 7
PAINLEVEL_OUTOF10: 10

## 2023-06-07 ASSESSMENT — PAIN DESCRIPTION - LOCATION
LOCATION: HEAD
LOCATION: ABDOMEN

## 2023-06-07 ASSESSMENT — PAIN - FUNCTIONAL ASSESSMENT
PAIN_FUNCTIONAL_ASSESSMENT: ACTIVITIES ARE NOT PREVENTED
PAIN_FUNCTIONAL_ASSESSMENT: 0-10

## 2023-06-07 ASSESSMENT — PAIN DESCRIPTION - DESCRIPTORS
DESCRIPTORS: SHARP
DESCRIPTORS: THROBBING

## 2023-06-07 ASSESSMENT — PAIN DESCRIPTION - PAIN TYPE: TYPE: ACUTE PAIN

## 2023-06-07 ASSESSMENT — PAIN DESCRIPTION - ONSET: ONSET: ON-GOING

## 2023-06-07 ASSESSMENT — PAIN DESCRIPTION - ORIENTATION: ORIENTATION: LEFT

## 2023-06-07 NOTE — ED NOTES
Pt presents to ED c/o diffuse abdominal pain for the past two months. Pt states she feels \"all gassed up\". Pt reports that she is constipated but denies n/v/d. Pt arrives A/Ox4, PWD, PMS intact. Pt resting on stretcher with call light in reach.      Bella Moncada RN  06/07/23 9181

## 2023-06-07 NOTE — ED PROVIDER NOTES
EMERGENCY DEPARTMENT ENCOUNTER      Pt Name: Lila Dhaliwal  MRN: [id number]  [place] 1980  Date of evaluation: 6/7/2023  Provider: Larissa Hawkins PA-C    CHIEF COMPLAINT       Chief Complaint   Patient presents with    Abdominal Pain         HISTORY OF PRESENT ILLNESS      Lila Dhaliwal is a 43 y.o. female who presents to the emergency department complaining of fatigue and chronic abdominal pain that is been bothering her for the past couple months. She states that she has been dealing with constipation, denies any nausea vomiting fevers chills or any chest pain or shortness of breath. Denies any blood in her stool. Has not taken any over-the-counter medication.   She states it hurts worse in the left side of her stomach        REVIEW OF SYSTEMS       Review of Systems   AS STATED IN HPI      PAST MEDICAL HISTORY     Past Medical History:   Diagnosis Date    Anemia     Chronic daily headache     COVID-19 vaccination not done     Dysmenorrhea     Exophthalmos     Fibroids     High blood pressure     Poor vision     left eye    Pseudotumor cerebri     Dr. Danika Walter last visit 3/2023    Wellness examination     Dr. Mendez Romo       Past Surgical History:   Procedure Laterality Date    GALLBLADDER SURGERY  2018    LUMBAR PUNCTURE      for therapeutic CSF drain         CURRENT MEDICATIONS       Discharge Medication List as of 6/7/2023  5:49 PM        CONTINUE these medications which have NOT CHANGED    Details   acetaZOLAMIDE (DIAMOX) 500 MG extended release capsule 3 capsules Orally Twice a day for 30 day(s)Historical Med      ferrous gluconate (FERGON) 324 (38 Fe) MG tablet 1 tablet with water or juice between meals Orally every other day for 60 daysHistorical Med      venlafaxine (EFFEXOR XR) 37.5 MG extended release capsule 1 capsule with food Orally in the evening for 30 daysHistorical Med      rizatriptan (MAXALT-MLT)
indicated       (Please note that portions of this note were completed with a voice recognition program.  Efforts were made to edit the dictations but occasionally words are mis-transcribed. Additionally, portions of this note may also include information that was incorporated after care transfer to another provider that were not available at the time of my evaluation.   Some of this information could likely include laboratory values, vital sign updates, medications etc.)    Carolina Moreland DO   Attending Emergency Physician       Carolina Moreland DO  06/07/23 8370

## 2023-06-20 ENCOUNTER — HOSPITAL ENCOUNTER (OUTPATIENT)
Age: 43
Setting detail: SPECIMEN
Discharge: HOME | End: 2023-06-20

## 2023-06-20 ENCOUNTER — INITIAL CONSULT (OUTPATIENT)
Dept: OBGYN CLINIC | Age: 43
End: 2023-06-20

## 2023-06-20 VITALS
WEIGHT: 200 LBS | DIASTOLIC BLOOD PRESSURE: 100 MMHG | HEART RATE: 99 BPM | SYSTOLIC BLOOD PRESSURE: 138 MMHG | BODY MASS INDEX: 33.28 KG/M2

## 2023-06-20 DIAGNOSIS — R41.3 MEMORY LOSS OR IMPAIRMENT: ICD-10-CM

## 2023-06-20 DIAGNOSIS — N92.0 MENORRHAGIA WITH REGULAR CYCLE: ICD-10-CM

## 2023-06-20 DIAGNOSIS — D25.9 UTERINE LEIOMYOMA, UNSPECIFIED LOCATION: Primary | ICD-10-CM

## 2023-06-20 NOTE — PATIENT INSTRUCTIONS
We will contact you with the results of today's biopsy. Please read the information given to you on Myfembree for treatment of fibroids. Once the results from today's biopsy are back we will restart the preauthorization process for the medication.

## 2023-06-20 NOTE — PROGRESS NOTES
[address] [facility] [place] [id number] Parmjit Bojorquez [address] [facility]     Date: 23    Chief Complaint   Patient presents with    Surgical Consult     Pt interested in hyst, looks like insurance was not approving. Sergio Verdin is a   2 para . We were unable to get PA for hysterectomy and [facility] wanted a preop EMB? She returns today with her  and states that she no longer desires surgery  But would like to try medical management. She had previously been counseled on medical management with Myfembree. She is here today for EMB secondary to     ICD-10-CM    1. Uterine leiomyoma, unspecified location  D25.9       2. Menorrhagia with regular cycle  N92.0       3. Memory loss or impairment  R41. 3       . Discussed with patient today abnormal bleeding and management of it. EMB was offered in the office and we did discuss the possibility of hysteroscopy/D&C. Patient agrees to proceed. Physical exam  Vitals:    23 1039   BP: (!) 138/100   Pulse: 99       No vulvar or vaginal or cervical lesions noted. Normal vaginal discharge present. EMB performed uneventfully. Uterine cavity only measured 4-centimeters and may not have been in the uterine cavity? Only a modest amount of specimen obtained and sent for pathology. Patient tolerated procedure fairly well and left in good condition. She'll be contacted with results and recommendation for follow-up. She and her  were given information on Myfembree. David Kramer MD,Mph, [address] [facility].   [facility]

## 2023-06-22 LAB — SURGICAL PATHOLOGY REPORT: NORMAL

## 2023-06-26 ENCOUNTER — TELEPHONE (OUTPATIENT)
Dept: OBGYN CLINIC | Age: 43
End: 2023-06-26

## 2024-09-21 ENCOUNTER — HOSPITAL ENCOUNTER (EMERGENCY)
Facility: CLINIC | Age: 44
Discharge: LEFT BEFORE DISCHARGE | End: 2024-09-21
Attending: EMERGENCY MEDICINE
Payer: MEDICAID

## 2024-09-21 ENCOUNTER — APPOINTMENT (OUTPATIENT)
Dept: CT IMAGING | Facility: CLINIC | Age: 44
End: 2024-09-21
Payer: MEDICAID

## 2024-09-21 VITALS
BODY MASS INDEX: 33.28 KG/M2 | DIASTOLIC BLOOD PRESSURE: 100 MMHG | RESPIRATION RATE: 18 BRPM | TEMPERATURE: 98 F | OXYGEN SATURATION: 97 % | WEIGHT: 200 LBS | HEART RATE: 88 BPM | SYSTOLIC BLOOD PRESSURE: 150 MMHG

## 2024-09-21 DIAGNOSIS — G44.89 OTHER HEADACHE SYNDROME: Primary | ICD-10-CM

## 2024-09-21 LAB
ALBUMIN SERPL-MCNC: 3.8 G/DL (ref 3.5–5.2)
ALBUMIN/GLOB SERPL: 1.2 {RATIO} (ref 1–2.5)
ALP SERPL-CCNC: 88 U/L (ref 35–104)
ALT SERPL-CCNC: 13 U/L (ref 5–33)
ANION GAP SERPL CALCULATED.3IONS-SCNC: 10 MMOL/L (ref 9–17)
AST SERPL-CCNC: 16 U/L
B-HCG SERPL EIA 3RD IS-ACNC: <1 MIU/ML
BASOPHILS # BLD: 0.1 K/UL (ref 0–0.2)
BASOPHILS NFR BLD: 1 % (ref 0–2)
BILIRUB DIRECT SERPL-MCNC: <0.1 MG/DL
BILIRUB INDIRECT SERPL-MCNC: NORMAL MG/DL (ref 0–1)
BILIRUB SERPL-MCNC: 0.3 MG/DL (ref 0.3–1.2)
BUN SERPL-MCNC: 7 MG/DL (ref 6–20)
CALCIUM SERPL-MCNC: 9.2 MG/DL (ref 8.6–10.4)
CHLORIDE SERPL-SCNC: 108 MMOL/L (ref 98–107)
CO2 SERPL-SCNC: 25 MMOL/L (ref 20–31)
CREAT SERPL-MCNC: 0.7 MG/DL (ref 0.5–0.9)
EOSINOPHIL # BLD: 0.1 K/UL (ref 0–0.4)
EOSINOPHILS RELATIVE PERCENT: 1 % (ref 1–4)
ERYTHROCYTE [DISTWIDTH] IN BLOOD BY AUTOMATED COUNT: 15.7 % (ref 12.5–15.4)
GFR, ESTIMATED: >90 ML/MIN/1.73M2
GLUCOSE SERPL-MCNC: 84 MG/DL (ref 70–99)
HCT VFR BLD AUTO: 41.3 % (ref 36–46)
HGB BLD-MCNC: 14.2 G/DL (ref 12–16)
INR PPP: 0.9
LIPASE SERPL-CCNC: 29 U/L (ref 13–60)
LYMPHOCYTES NFR BLD: 2.7 K/UL (ref 1–4.8)
LYMPHOCYTES RELATIVE PERCENT: 37 % (ref 24–44)
MCH RBC QN AUTO: 28.7 PG (ref 26–34)
MCHC RBC AUTO-ENTMCNC: 34.5 G/DL (ref 31–37)
MCV RBC AUTO: 83.2 FL (ref 80–100)
MONOCYTES NFR BLD: 0.5 K/UL (ref 0.1–1.2)
MONOCYTES NFR BLD: 7 % (ref 2–11)
NEUTROPHILS NFR BLD: 54 % (ref 36–66)
NEUTS SEG NFR BLD: 4.1 K/UL (ref 1.8–7.7)
PARTIAL THROMBOPLASTIN TIME: 22.5 SEC (ref 21.3–31.3)
PLATELET # BLD AUTO: 192 K/UL (ref 140–450)
PMV BLD AUTO: 8.5 FL (ref 6–12)
POTASSIUM SERPL-SCNC: 3.8 MMOL/L (ref 3.7–5.3)
PROT SERPL-MCNC: 7.1 G/DL (ref 6.4–8.3)
PROTHROMBIN TIME: 9.5 SEC (ref 9.4–12.6)
RBC # BLD AUTO: 4.96 M/UL (ref 4–5.2)
SODIUM SERPL-SCNC: 143 MMOL/L (ref 135–144)
WBC OTHER # BLD: 7.5 K/UL (ref 3.5–11)

## 2024-09-21 PROCEDURE — 85730 THROMBOPLASTIN TIME PARTIAL: CPT

## 2024-09-21 PROCEDURE — 74177 CT ABD & PELVIS W/CONTRAST: CPT

## 2024-09-21 PROCEDURE — 80048 BASIC METABOLIC PNL TOTAL CA: CPT

## 2024-09-21 PROCEDURE — 2580000003 HC RX 258: Performed by: EMERGENCY MEDICINE

## 2024-09-21 PROCEDURE — 83690 ASSAY OF LIPASE: CPT

## 2024-09-21 PROCEDURE — 85025 COMPLETE CBC W/AUTO DIFF WBC: CPT

## 2024-09-21 PROCEDURE — 6360000002 HC RX W HCPCS: Performed by: EMERGENCY MEDICINE

## 2024-09-21 PROCEDURE — 85610 PROTHROMBIN TIME: CPT

## 2024-09-21 PROCEDURE — 36415 COLL VENOUS BLD VENIPUNCTURE: CPT

## 2024-09-21 PROCEDURE — 70450 CT HEAD/BRAIN W/O DYE: CPT

## 2024-09-21 PROCEDURE — 6360000004 HC RX CONTRAST MEDICATION: Performed by: EMERGENCY MEDICINE

## 2024-09-21 PROCEDURE — 80076 HEPATIC FUNCTION PANEL: CPT

## 2024-09-21 PROCEDURE — 84702 CHORIONIC GONADOTROPIN TEST: CPT

## 2024-09-21 PROCEDURE — 99285 EMERGENCY DEPT VISIT HI MDM: CPT

## 2024-09-21 PROCEDURE — 96374 THER/PROPH/DIAG INJ IV PUSH: CPT

## 2024-09-21 RX ORDER — 0.9 % SODIUM CHLORIDE 0.9 %
80 INTRAVENOUS SOLUTION INTRAVENOUS ONCE
Status: DISCONTINUED | OUTPATIENT
Start: 2024-09-21 | End: 2024-09-21 | Stop reason: HOSPADM

## 2024-09-21 RX ORDER — SODIUM CHLORIDE 0.9 % (FLUSH) 0.9 %
10 SYRINGE (ML) INJECTION PRN
Status: DISCONTINUED | OUTPATIENT
Start: 2024-09-21 | End: 2024-09-21 | Stop reason: HOSPADM

## 2024-09-21 RX ORDER — IOPAMIDOL 755 MG/ML
75 INJECTION, SOLUTION INTRAVASCULAR
Status: COMPLETED | OUTPATIENT
Start: 2024-09-21 | End: 2024-09-21

## 2024-09-21 RX ORDER — ONDANSETRON 2 MG/ML
4 INJECTION INTRAMUSCULAR; INTRAVENOUS ONCE
Status: COMPLETED | OUTPATIENT
Start: 2024-09-21 | End: 2024-09-21

## 2024-09-21 RX ORDER — 0.9 % SODIUM CHLORIDE 0.9 %
1000 INTRAVENOUS SOLUTION INTRAVENOUS ONCE
Status: COMPLETED | OUTPATIENT
Start: 2024-09-21 | End: 2024-09-21

## 2024-09-21 RX ADMIN — Medication 80 ML: at 10:31

## 2024-09-21 RX ADMIN — SODIUM CHLORIDE 1000 ML: 9 INJECTION, SOLUTION INTRAVENOUS at 09:34

## 2024-09-21 RX ADMIN — ONDANSETRON 4 MG: 2 INJECTION, SOLUTION INTRAMUSCULAR; INTRAVENOUS at 09:35

## 2024-09-21 RX ADMIN — IOPAMIDOL 75 ML: 755 INJECTION, SOLUTION INTRAVENOUS at 10:30

## 2024-09-21 RX ADMIN — SODIUM CHLORIDE, PRESERVATIVE FREE 10 ML: 5 INJECTION INTRAVENOUS at 10:30

## 2024-09-21 ASSESSMENT — ENCOUNTER SYMPTOMS
CHEST TIGHTNESS: 0
NAUSEA: 1
COUGH: 0
VOMITING: 0
CONSTIPATION: 0
DIARRHEA: 0
EYE REDNESS: 0
SHORTNESS OF BREATH: 0
ABDOMINAL PAIN: 1

## 2024-09-21 ASSESSMENT — PAIN DESCRIPTION - ORIENTATION: ORIENTATION: LEFT;RIGHT

## 2024-09-21 ASSESSMENT — PAIN SCALES - GENERAL: PAINLEVEL_OUTOF10: 8

## 2024-09-21 ASSESSMENT — PAIN DESCRIPTION - PAIN TYPE: TYPE: CHRONIC PAIN

## 2024-09-21 ASSESSMENT — PAIN - FUNCTIONAL ASSESSMENT: PAIN_FUNCTIONAL_ASSESSMENT: 0-10

## 2024-09-21 ASSESSMENT — PAIN DESCRIPTION - LOCATION: LOCATION: HEAD

## 2024-09-21 ASSESSMENT — PAIN DESCRIPTION - FREQUENCY: FREQUENCY: CONTINUOUS

## 2024-11-18 ENCOUNTER — HOSPITAL ENCOUNTER (OUTPATIENT)
Dept: ULTRASOUND IMAGING | Facility: CLINIC | Age: 44
Discharge: HOME | End: 2024-11-20
Payer: MEDICAID

## 2024-11-18 DIAGNOSIS — N94.9 DISEASE OF FEMALE GENITAL ORGANS: ICD-10-CM

## 2024-11-18 DIAGNOSIS — R93.89 ABNORMAL RADIOLOGICAL FINDINGS IN SKIN AND SUBCUTANEOUS TISSUE: ICD-10-CM

## 2024-11-18 DIAGNOSIS — R10.30 LOWER ABDOMINAL PAIN: ICD-10-CM

## 2024-11-18 PROCEDURE — 76856 US EXAM PELVIC COMPLETE: CPT

## 2025-02-27 ENCOUNTER — OFFICE VISIT (OUTPATIENT)
Dept: NEUROLOGY | Age: 45
End: 2025-02-27

## 2025-02-27 VITALS
DIASTOLIC BLOOD PRESSURE: 118 MMHG | WEIGHT: 205.6 LBS | BODY MASS INDEX: 34.26 KG/M2 | SYSTOLIC BLOOD PRESSURE: 160 MMHG | HEIGHT: 65 IN | HEART RATE: 92 BPM

## 2025-02-27 DIAGNOSIS — G93.2 IIH (IDIOPATHIC INTRACRANIAL HYPERTENSION): Primary | ICD-10-CM

## 2025-02-27 DIAGNOSIS — I67.5 MOYA MOYA DISEASE: ICD-10-CM

## 2025-02-27 DIAGNOSIS — H46.9 OPTIC NEUROPATHY: ICD-10-CM

## 2025-02-27 NOTE — PROGRESS NOTES
Relugolix-Estradiol-Norethind 40-1-0.5 MG TABS Take 40 mg by mouth daily 30 tablet 11    acetaZOLAMIDE (DIAMOX) 500 MG extended release capsule       ferrous gluconate (FERGON) 324 (38 Fe) MG tablet 1 tablet with water or juice between meals Orally every other day for 60 days      amLODIPine-olmesartan (NEWTON) 5-20 MG per tablet       omeprazole (PRILOSEC) 40 MG delayed release capsule       NONFORMULARY Blood pressure medication and medication for fluid on brain unknown of names      venlafaxine (EFFEXOR XR) 37.5 MG extended release capsule 1 capsule with food Orally in the evening for 30 days (Patient not taking: Reported on 9/21/2024)      rizatriptan (MAXALT-MLT) 10 MG disintegrating tablet Take 1 tablet by mouth once as needed for Migraine May repeat in 2 hours if needed (Patient not taking: Reported on 2/27/2025)       No current facility-administered medications for this visit.        Allergies   Allergen Reactions    Codeine Nausea Only     Other reaction(s): makes her sick          REVIEW OF SYSTEMS:       All items selected indicate a positive finding.    Those items not selected are negative.  Constitutional [] Weight loss/gain   [] Fatigue  [] Fever/Chills   HEENT [] Hearing Loss  [] Visual Disturbance  [] Tinnitus  [] Eye pain  [] Vertigo   Respiratory [] Shortness of Breath  [] Cough  [] Snoring   Cardiovascular [] Chest Pain  [] Palpitations  [] Lightheaded   GI [] Constipation  [] Diarrhea  [] Swallowing change  [] Nausea/vomiting    [] Urinary Frequency  [] Urinary Urgency   Musculoskeletal [] Neck pain  [] Back pain  [] Muscle pain  [] Restless legs  [] Joint pain   Dermatologic [] Skin changes   Neurologic [] Memory loss/confusion  [] Seizures  [] Trouble walking or imbalance  [] Dizziness  [] Sleep disturbance  [] Weakness  [] Numbness  [] Tremors  [] Speech Difficulty  [] Headaches  [] Light Sensitivity  [] Sound Sensitivity   Endocrinology []Excessive thirst  []Excessive hunger   Psychiatric

## 2025-03-03 ENCOUNTER — HOSPITAL ENCOUNTER (OUTPATIENT)
Age: 45
Setting detail: SPECIMEN
Discharge: HOME | End: 2025-03-03

## 2025-03-03 DIAGNOSIS — H46.9 OPTIC NEUROPATHY: ICD-10-CM

## 2025-03-03 DIAGNOSIS — G93.2 IIH (IDIOPATHIC INTRACRANIAL HYPERTENSION): ICD-10-CM

## 2025-03-03 LAB
ANION GAP SERPL CALCULATED.3IONS-SCNC: 10 MMOL/L (ref 9–16)
CHLORIDE SERPL-SCNC: 108 MMOL/L (ref 98–107)
CO2 SERPL-SCNC: 22 MMOL/L (ref 20–31)
POTASSIUM SERPL-SCNC: 3.8 MMOL/L (ref 3.7–5.3)
SODIUM SERPL-SCNC: 140 MMOL/L (ref 136–145)

## 2025-03-11 ENCOUNTER — HOSPITAL ENCOUNTER (OUTPATIENT)
Dept: INTERVENTIONAL RADIOLOGY/VASCULAR | Age: 45
Discharge: HOME | End: 2025-03-13
Payer: MEDICAID

## 2025-03-11 VITALS
OXYGEN SATURATION: 100 % | DIASTOLIC BLOOD PRESSURE: 106 MMHG | SYSTOLIC BLOOD PRESSURE: 150 MMHG | TEMPERATURE: 97.5 F | RESPIRATION RATE: 14 BRPM | HEART RATE: 99 BPM

## 2025-03-11 DIAGNOSIS — G93.2 IIH (IDIOPATHIC INTRACRANIAL HYPERTENSION): ICD-10-CM

## 2025-03-11 DIAGNOSIS — H46.9 OPTIC NEUROPATHY: ICD-10-CM

## 2025-03-11 LAB
ALBUMIN CSF-MCNC: 203 MG/L (ref 70–350)
ALBUMIN INDEX: 50.8
ALBUMIN SERPL-MCNC: 4 G/DL (ref 3.5–5.2)
APPEARANCE CSF: CLEAR
CLOT CHECK: ABNORMAL
COLOR CSF: COLORLESS
GLUCOSE CSF-MCNC: 57 MG/DL (ref 40–70)
IGG CSF-MCNC: 4 MG/DL (ref 1–3)
IGG INDEX CSF: 0.64
IGG SERPL-MCNC: 1241 MG/DL (ref 700–1600)
IGG SYNTHESIS RATE CSF: 1.2 MG/24 H
OLIGOCLONAL BANDS: ABNORMAL
PROT CSF-MCNC: 36.5 MG/DL (ref 15–45)
RBC # FLD MANUAL: 3 CELLS/UL
SPECIMEN VOL CSF: ABNORMAL ML
SURGICAL PATHOLOGY REPORT: NORMAL
TOTAL CELLS COUNTED BRONCH: 0 CELLS/UL (ref 0–5)
TUBE # CSF: 3
XANTHOCHROMIA CSF QL: ABNORMAL

## 2025-03-11 PROCEDURE — 82042 OTHER SOURCE ALBUMIN QUAN EA: CPT

## 2025-03-11 PROCEDURE — 84157 ASSAY OF PROTEIN OTHER: CPT

## 2025-03-11 PROCEDURE — 7100000041 HC SPAR PHASE II RECOVERY - ADDTL 15 MIN

## 2025-03-11 PROCEDURE — 88108 CYTOPATH CONCENTRATE TECH: CPT

## 2025-03-11 PROCEDURE — 86592 SYPHILIS TEST NON-TREP QUAL: CPT

## 2025-03-11 PROCEDURE — 87205 SMEAR GRAM STAIN: CPT

## 2025-03-11 PROCEDURE — 7100000040 HC SPAR PHASE II RECOVERY - FIRST 15 MIN

## 2025-03-11 PROCEDURE — 82040 ASSAY OF SERUM ALBUMIN: CPT

## 2025-03-11 PROCEDURE — 87070 CULTURE OTHR SPECIMN AEROBIC: CPT

## 2025-03-11 PROCEDURE — 82784 ASSAY IGA/IGD/IGG/IGM EACH: CPT

## 2025-03-11 PROCEDURE — 89050 BODY FLUID CELL COUNT: CPT

## 2025-03-11 PROCEDURE — 62328 DX LMBR SPI PNXR W/FLUOR/CT: CPT

## 2025-03-11 PROCEDURE — 83916 OLIGOCLONAL BANDS: CPT

## 2025-03-11 PROCEDURE — 87015 SPECIMEN INFECT AGNT CONCNTJ: CPT

## 2025-03-11 PROCEDURE — 82164 ANGIOTENSIN I ENZYME TEST: CPT

## 2025-03-11 PROCEDURE — 82945 GLUCOSE OTHER FLUID: CPT

## 2025-03-11 RX ORDER — SODIUM CHLORIDE 9 MG/ML
INJECTION, SOLUTION INTRAVENOUS CONTINUOUS
Status: DISCONTINUED | OUTPATIENT
Start: 2025-03-11 | End: 2025-03-14 | Stop reason: HOSPADM

## 2025-03-11 ASSESSMENT — PAIN SCALES - GENERAL
PAINLEVEL_OUTOF10: 0
PAINLEVEL_OUTOF10: 0

## 2025-03-11 ASSESSMENT — PAIN - FUNCTIONAL ASSESSMENT: PAIN_FUNCTIONAL_ASSESSMENT: 0-10

## 2025-03-11 NOTE — PROGRESS NOTES
Patient to IR for lumbar puncture. Patient assisted to left lateral position.  Dr. Escamilla/ ERAN PA and LIO/HORTENSIA RTs at bedside.  Site prepped and draped, area numbed with lidocaine.  Opening pressure 31cm H2O.  19ml of clear fluid obtained.  Closing pressure 14cm H2O.  Specimen collected. Yellow top tube drawn in preop and sent to lab for banding.  Band aid placed to site.  Patient tolerated well.

## 2025-03-11 NOTE — DISCHARGE INSTRUCTIONS
Discharge Instructions      The procedure that you have undergone is called a lumbar puncture, .  There are a few important guidelines you should follow  which include:    Keep you head elevated 30-45 degrees for at least 8 hours following your lumbar pucture.    Drink large amounts of non-alcoholic beverages.  Alcohol can further dehydrate you, so we recommend liquids such as :  Juices, water, soda, etc.    DO NOT strain or over-exert yourself for several hours after your lumbar puncture.  Walking and normal activities are acceptable, as long as they are not overdone.No heavy lifting over 10 lbs. For 24 hours.    Flu-like symptoms may occur, or if you have questions, you may wish to call the Radiology Departments at [phone], Monday - Friday, 7:30 a.m. - 4:30 p.m.  After 4:30 p.m. And on weekends call [phone].      Please do not hesitate to ask if there are any further questions we can answer for you or anything else we can do to make you more comfortable.

## 2025-03-11 NOTE — H&P
History and Physical    Pt Name: Dora Oh  MRN: [id number]  YOB: 1980  Date of evaluation: 3/11/2025  Primary Care Physician: Reinaldo Sullivan MD    SUBJECTIVE:   History of Chief Complaint:    Dora Oh is a 44 y.o. female who is scheduled today for IR LUMBAR PUNCTURE FOR DIAGNOSIS. Patient reports she has been having headaches, blurred vision and dizziness, ongoing for about two years. She denies any pain, imbalance, weakness, numbness or tingling.   Allergies  is allergic to codeine.  Medications  Prior to Admission medications    Medication Sig Start Date End Date Taking? Authorizing Provider   Potassium Chloride Anjelica ER (KLOR-CON M20 PO) Take by mouth   Yes Radha Florentino MD   amLODIPine-olmesartan (NEWTON) 5-20 MG per tablet  1/30/23  Yes Radha Florentino MD   omeprazole (PRILOSEC) 40 MG delayed release capsule  1/30/23  Yes Radha Florentino MD   Relugolix-Estradiol-Norethind 40-1-0.5 MG TABS Take 40 mg by mouth daily 6/24/23   Antonio Sams MD   acetaZOLAMIDE (DIAMOX) 500 MG extended release capsule     Radha Florentino MD   ferrous gluconate (FERGON) 324 (38 Fe) MG tablet 1 tablet with water or juice between meals Orally every other day for 60 days 10/26/22   Radha Florentino MD   NONFORMULARY Blood pressure medication and medication for fluid on brain unknown of names    Radha Florentino MD     Past Medical History    has a past medical history of Anemia, Chronic daily headache, COVID-19 vaccination not done, Dysmenorrhea, Exophthalmos, Fibroids, High blood pressure, IIH (idiopathic intracranial hypertension), Brown-brown disease, Poor vision, Pseudotumor cerebri, Uterine leiomyoma, and Wellness examination.  Past Surgical History   has a past surgical history that includes Gallbladder surgery (2018); Lumbar puncture; and Cholecystectomy.  Social History   reports that she has been smoking cigarettes. She has never used smokeless tobacco.   reports

## 2025-03-11 NOTE — BRIEF OP NOTE
Brief Postoperative Note Lumbar Puncture    Dora Oh  YOB: 1980  [id number]    Pre-operative Diagnosis: Idiopathic Intracranial Hypertension    Post-operative Diagnosis: Same    Procedure: Fluoro guided Lumbar Puncture    Anesthesia: 1% Lidocaine    Surgeons/Assistants: Dr. Escamilla and Marlen Martin PA-C    Complications: None    EBL: Minimal    Specimens: Obtained and sent to lab    Successful fluoroscopy guided lumbar puncture performed with patient placed in left lateral decubitus position on IR table. 20 gauge spinal needle used to access along L3. Opening pressure: 31 cm of H2O, closing pressure: 14 cm of H2O. 19 ml clear CSF obtained.  Dressing applied. Instructions given. Patient tolerated procedure well.    Electronically signed by Marlen Martin PA-C on 3/11/2025 at 11:09 AM

## 2025-03-12 NOTE — PROGRESS NOTES
0815 Call received from lab stating yellow top tube of blood sent yesterday for banding is clotted and needs redrawn.      0825 call placed to patient and informed of above need for her to return to [facility] for a redraw.  Pt will be in this am  Lab requisition taken to out patient lab    0835  Pt called and stated she will not be in today as she does not have a ride.  Informed her of importance of blood draw and that everything is ready for her in the outpatient lab  Verbalizes understanding

## 2025-03-13 ENCOUNTER — TELEPHONE (OUTPATIENT)
Dept: NEUROLOGY | Age: 45
End: 2025-03-13

## 2025-03-13 NOTE — TELEPHONE ENCOUNTER
Can we clarify the dose she is taking of her diamox because I would a different dose listed on recent [facility] notes from our office notes

## 2025-03-13 NOTE — TELEPHONE ENCOUNTER
Patient called into office asking for lab results. She was informed that there are some labs still in process. She is asking if advise on whether to start furosemide or not can be given with the labs that are already in. Please advise.

## 2025-03-14 LAB
ACE CSF-CCNC: 1.3 U/L (ref 0–2.5)
MICROORGANISM SPEC CULT: NORMAL
MICROORGANISM/AGENT SPEC: NORMAL
SPECIMEN DESCRIPTION: NORMAL
VDRL CSF QL: NONREACTIVE

## 2025-03-14 NOTE — TELEPHONE ENCOUNTER
Call placed to the patient.  Patient confirmed that she is using Diamox 500 mg capsules, taking three capsules twice daily.  Writer advised that I would forward this information to Callie.

## 2025-03-17 ENCOUNTER — RESULTS FOLLOW-UP (OUTPATIENT)
Dept: INTERVENTIONAL RADIOLOGY/VASCULAR | Age: 45
End: 2025-03-17

## 2025-03-24 LAB
CSF ISOELECTRIC FOCUSING INTERPRETATION: NORMAL
OLIGOCLONAL BANDS CSF IEF: NORMAL BANDS (ref 0–1)
OLIGOCLONAL BANDS: NORMAL

## 2025-05-05 ENCOUNTER — OFFICE VISIT (OUTPATIENT)
Dept: NEUROLOGY | Age: 45
End: 2025-05-05
Payer: MEDICAID

## 2025-05-05 VITALS
SYSTOLIC BLOOD PRESSURE: 154 MMHG | DIASTOLIC BLOOD PRESSURE: 108 MMHG | WEIGHT: 205.6 LBS | HEIGHT: 65 IN | HEART RATE: 97 BPM | BODY MASS INDEX: 34.26 KG/M2

## 2025-05-05 DIAGNOSIS — H46.9 OPTIC NEUROPATHY: ICD-10-CM

## 2025-05-05 DIAGNOSIS — G93.2 IIH (IDIOPATHIC INTRACRANIAL HYPERTENSION): Primary | ICD-10-CM

## 2025-05-05 DIAGNOSIS — I67.5 MOYA MOYA DISEASE: ICD-10-CM

## 2025-05-05 PROCEDURE — 99214 OFFICE O/P EST MOD 30 MIN: CPT | Performed by: PHYSICIAN ASSISTANT

## 2025-05-05 PROCEDURE — 4004F PT TOBACCO SCREEN RCVD TLK: CPT | Performed by: PHYSICIAN ASSISTANT

## 2025-05-05 PROCEDURE — G8427 DOCREV CUR MEDS BY ELIG CLIN: HCPCS | Performed by: PHYSICIAN ASSISTANT

## 2025-05-05 PROCEDURE — G8417 CALC BMI ABV UP PARAM F/U: HCPCS | Performed by: PHYSICIAN ASSISTANT

## 2025-05-05 RX ORDER — VONOPRAZAN FUMARATE 13.36 MG/1
TABLET ORAL
COMMUNITY
Start: 2025-03-18

## 2025-05-05 RX ORDER — FUROSEMIDE 20 MG/1
20 TABLET ORAL DAILY
Qty: 30 TABLET | Refills: 5 | Status: SHIPPED | OUTPATIENT
Start: 2025-05-05

## 2025-05-05 NOTE — PROGRESS NOTES
[address]  [place] [address]  Dept: [phone]    PATIENT NAME: Dora Oh  PATIENT MRN: [id number]  PRIMARY CARE PHYSICIAN: Reinaldo Sullivan MD    HPI:      Dora Oh is a 44 y.o. female who returns regarding IIH.    Repeat LP indicated opening pressure elevated at 31. She refuses to consider higher doses of diamox. No follow up with neuro-opth since last appointment and she will not be following with [facility] in the future.     Prior information:     Reports long-standing diagnosis of IIH with multiple past LP- last 2016. She previously followed with [facility] neurology and neurosurgery. There was consideration of venous stenting, but she is hesitant to proceed with this. She also follows with [facility] closely. Unfortunately her vision remains poor with general blurred vision: \"I can't even watch TV.\" After an LP, her vision will be improved.     [facility] notes have reviewed at time of appointment. She was seen once earlier this month at [facility] who wanted repeat LP, but it was not completed due to insurance issues.     She has been managed on diamox since 2016.      MRI brain w wo Jan 2025: IMPRESSION:   * Similar findings suggestive of idiopathic intracranial hypertension.   *  White matter signal abnormalities are nonspecific, but could be seen in the setting of chronic microvascular ischemic changes, migraines, or demyelinating disease among other etiologies.   *  Apparent optic nerve atrophy.   *  Accelerated susceptibility artifact in the bilateral globus pallidi, nonspecific.     PREVIOUS WORKUP:     CTA Feb 2024: In conjunction with findings identified on MRI of the brain, findings are favored to represent moyamoya disease. Additional etiologies such as chronic vasculitis or atheromatous disease are also considered but favored to be less likely.   Past Medical History:   Diagnosis Date    Anemia     Chronic daily headache     COVID-19 vaccination not done

## 2025-06-11 ENCOUNTER — OFFICE VISIT (OUTPATIENT)
Dept: NEUROLOGY | Age: 45
End: 2025-06-11
Payer: MEDICAID

## 2025-06-11 VITALS
DIASTOLIC BLOOD PRESSURE: 106 MMHG | HEIGHT: 65 IN | BODY MASS INDEX: 34.99 KG/M2 | SYSTOLIC BLOOD PRESSURE: 144 MMHG | HEART RATE: 97 BPM | WEIGHT: 210 LBS

## 2025-06-11 DIAGNOSIS — G93.2 IIH (IDIOPATHIC INTRACRANIAL HYPERTENSION): Primary | ICD-10-CM

## 2025-06-11 DIAGNOSIS — I67.5 MOYA MOYA DISEASE: ICD-10-CM

## 2025-06-11 DIAGNOSIS — I67.1 MIDDLE CEREBRAL ARTERY ANEURYSM: ICD-10-CM

## 2025-06-11 PROCEDURE — G8417 CALC BMI ABV UP PARAM F/U: HCPCS | Performed by: PSYCHIATRY & NEUROLOGY

## 2025-06-11 PROCEDURE — 99205 OFFICE O/P NEW HI 60 MIN: CPT | Performed by: PSYCHIATRY & NEUROLOGY

## 2025-06-11 PROCEDURE — G8427 DOCREV CUR MEDS BY ELIG CLIN: HCPCS | Performed by: PSYCHIATRY & NEUROLOGY

## 2025-06-11 PROCEDURE — 4004F PT TOBACCO SCREEN RCVD TLK: CPT | Performed by: PSYCHIATRY & NEUROLOGY

## 2025-06-11 RX ORDER — AMOXICILLIN 875 MG/1
TABLET, COATED ORAL
COMMUNITY
Start: 2025-05-20

## 2025-06-11 RX ORDER — FEXOFENADINE HCL 180 MG/1
TABLET ORAL
COMMUNITY
Start: 2025-05-13

## 2025-06-11 RX ORDER — PANTOPRAZOLE SODIUM 40 MG/1
TABLET, DELAYED RELEASE ORAL
COMMUNITY
Start: 2025-05-08

## 2025-06-11 RX ORDER — SUCRALFATE 1 G/1
TABLET ORAL
COMMUNITY
Start: 2025-05-08

## 2025-06-11 NOTE — PROGRESS NOTES
headaches while on Diamox, a more detailed study of her brain vessels will be recommended. If her symptoms worsen and she is unable to tolerate Diamox or if it becomes ineffective, consideration of a stent procedure would be reasonable.    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    New Patient Visit Time:  62 minutes  Time Spent in Counselin minutes  Greater than 50% of the time in this visit was spent counseling and coordinating care of this patient.   Patient given educational materials - see patient instructions.  Discussed use, benefit, and side effects of prescribed medications.  Personally reviewed imaging with patients and all questions answered.  Pt voiced understanding.  Patient agreed with treatment plan. Follow up as directed above.       If you have any questions, please do not hesitate to call me.  I look forward to following Dora Oh      Sincerely,        Kp Miles MD  Electronically signed by Kp Miles MD on 2025 at 10:56 AM     The patient (or guardian, if applicable) and other individuals in attendance with the patient were advised that Artificial Intelligence will be utilized during this visit to record, process the conversation to generate a clinical note, and support improvement of the AI technology. The patient (or guardian, if applicable) and other individuals in attendance at the appointment consented to the use of AI, including the recording.

## 2025-06-14 PROBLEM — I67.1 MIDDLE CEREBRAL ARTERY ANEURYSM: Status: ACTIVE | Noted: 2025-06-14

## 2025-06-14 PROBLEM — G93.2 IIH (IDIOPATHIC INTRACRANIAL HYPERTENSION): Status: ACTIVE | Noted: 2025-06-14

## 2025-06-14 PROBLEM — I67.5 MOYA MOYA DISEASE: Status: ACTIVE | Noted: 2025-06-14

## 2025-06-14 ASSESSMENT — ENCOUNTER SYMPTOMS
VOICE CHANGE: 0
COLOR CHANGE: 0
PHOTOPHOBIA: 0
SHORTNESS OF BREATH: 0
COUGH: 0
NAUSEA: 0
VOMITING: 0

## 2025-08-04 ENCOUNTER — OFFICE VISIT (OUTPATIENT)
Dept: NEUROLOGY | Age: 45
End: 2025-08-04
Payer: MEDICAID

## 2025-08-04 VITALS
HEART RATE: 93 BPM | SYSTOLIC BLOOD PRESSURE: 151 MMHG | WEIGHT: 207.2 LBS | HEIGHT: 65 IN | BODY MASS INDEX: 34.52 KG/M2 | DIASTOLIC BLOOD PRESSURE: 111 MMHG

## 2025-08-04 DIAGNOSIS — G93.2 IIH (IDIOPATHIC INTRACRANIAL HYPERTENSION): Primary | ICD-10-CM

## 2025-08-04 DIAGNOSIS — I67.5 MOYA MOYA DISEASE: ICD-10-CM

## 2025-08-04 PROCEDURE — 99214 OFFICE O/P EST MOD 30 MIN: CPT | Performed by: PHYSICIAN ASSISTANT

## 2025-08-04 PROCEDURE — G8427 DOCREV CUR MEDS BY ELIG CLIN: HCPCS | Performed by: PHYSICIAN ASSISTANT

## 2025-08-04 PROCEDURE — 4004F PT TOBACCO SCREEN RCVD TLK: CPT | Performed by: PHYSICIAN ASSISTANT

## 2025-08-04 PROCEDURE — G8417 CALC BMI ABV UP PARAM F/U: HCPCS | Performed by: PHYSICIAN ASSISTANT

## 2025-08-04 RX ORDER — HYDROCODONE BITARTRATE AND ACETAMINOPHEN 5; 325 MG/1; MG/1
TABLET ORAL
COMMUNITY
Start: 2025-07-24 | End: 2025-08-04

## 2025-08-04 RX ORDER — AMOXICILLIN 500 MG/1
CAPSULE ORAL
COMMUNITY
Start: 2025-07-03

## 2025-08-04 RX ORDER — DICYCLOMINE HYDROCHLORIDE 10 MG/1
10 CAPSULE ORAL
COMMUNITY
Start: 2025-06-16

## 2025-08-04 RX ORDER — AMLODIPINE AND OLMESARTAN MEDOXOMIL 5; 40 MG/1; MG/1
TABLET ORAL
COMMUNITY
Start: 2025-07-07

## 2025-08-04 RX ORDER — CLINDAMYCIN HYDROCHLORIDE 300 MG/1
300 CAPSULE ORAL
COMMUNITY
Start: 2025-07-18

## 2025-08-04 RX ORDER — TRIAZOLAM 0.25 MG
0.25 TABLET ORAL
COMMUNITY
Start: 2025-07-03

## 2025-08-04 RX ORDER — CHLORHEXIDINE GLUCONATE ORAL RINSE 1.2 MG/ML
SOLUTION DENTAL
COMMUNITY
Start: 2025-07-18

## 2025-08-04 RX ORDER — SIMETHICONE 80 MG
80 TABLET,CHEWABLE ORAL
COMMUNITY
Start: 2025-05-13

## 2025-08-04 RX ORDER — FAMOTIDINE 20 MG/1
20 TABLET, FILM COATED ORAL
COMMUNITY
Start: 2025-07-29

## 2025-08-04 RX ORDER — IBUPROFEN 800 MG/1
800 TABLET, FILM COATED ORAL
COMMUNITY
Start: 2025-07-18